# Patient Record
Sex: FEMALE | Race: WHITE | NOT HISPANIC OR LATINO
[De-identification: names, ages, dates, MRNs, and addresses within clinical notes are randomized per-mention and may not be internally consistent; named-entity substitution may affect disease eponyms.]

---

## 2018-02-21 PROBLEM — Z00.00 ENCOUNTER FOR PREVENTIVE HEALTH EXAMINATION: Status: ACTIVE | Noted: 2018-02-21

## 2018-03-05 ENCOUNTER — APPOINTMENT (OUTPATIENT)
Dept: OPHTHALMOLOGY | Facility: CLINIC | Age: 68
End: 2018-03-05

## 2023-05-08 ENCOUNTER — APPOINTMENT (OUTPATIENT)
Dept: PAIN MANAGEMENT | Facility: CLINIC | Age: 73
End: 2023-05-08
Payer: MEDICARE

## 2023-05-08 VITALS — HEART RATE: 79 BPM | SYSTOLIC BLOOD PRESSURE: 115 MMHG | OXYGEN SATURATION: 95 % | DIASTOLIC BLOOD PRESSURE: 70 MMHG

## 2023-05-08 DIAGNOSIS — Z86.79 PERSONAL HISTORY OF OTHER DISEASES OF THE CIRCULATORY SYSTEM: ICD-10-CM

## 2023-05-08 PROCEDURE — 99203 OFFICE O/P NEW LOW 30 MIN: CPT

## 2023-05-08 NOTE — DATA REVIEWED
[FreeTextEntry1] : Lumbar x-ray\par There is anterolisthesis L4-5, L5-S1 there is motion in flexion and in extension there is degenerative malalignment alignment of the upper lumbar region no lytic or blastic bony lesions\par There is scoliotic curvature identified 26.2 degrees in the upper curve of 14.8 degrees of the lower curve is measured with a Cerna technique no bony anomalies the upper curve is convex right centered in the lower thoracic spine lower curve is convex to the left centered in the mid lumbar spine there is grade 1-2 anterolisthesis L4-5\par CT lumbar spine October 2022 compared to MRI from June 2022\par There is near complete loss of disc space at L3-4 slight retrolisthesis there is moderate foraminal stenosis on the right there is bilateral ligamentum flavum hypertrophy these findings produce moderate to severe spinal canal stenosis\par L4-5 there is moderate disc bulging severe foraminal stenosis bilaterally there is bilateral lateral recess stenosis severe facet arthropathy and severe spinal stenosis\par At L5-S1 there is grade 1 anterolisthesis there is moderate disc bulging severe foraminal stenosis bilaterally\par CT October 2022 cervical spine no evidence of metastatic disease there is moderate degenerative disc disease from C2-C7 T1 mild to moderate foraminal stenosis at C4-5, C5-6 C6-7 and C7-T1\par October 2022\par No evidence of osteoporotic fractures DEXA scan\par

## 2023-05-08 NOTE — PHYSICAL EXAM
[Normal] : Well developed, in no acute distress, alert and oriented to person, place and time [Facet Tenderness] : facet tenderness [Paraspinal Tenderness] : paraspinal tenderness [Walker] : ambulates with walker [Neer's] : positive Neer's Test [Patton's Test] : positive Patton's Test [UE/LE] : Motor: 5/5 motor strength in bilateral upper & lower extremities [Sacroiliac tenderness] : no sacroiliac tenderness [Spurling] : negative Spurling's Test [Flores's Sign] : negative Flores's Sign [SLR] : negative straight leg raise [de-identified] : [facility]  [de-identified] : Decreased flexion/extension and lateral bending [de-identified] : flexed walking gait

## 2023-07-14 ENCOUNTER — APPOINTMENT (OUTPATIENT)
Dept: PAIN MANAGEMENT | Facility: CLINIC | Age: 73
End: 2023-07-14
Payer: MEDICARE

## 2023-07-14 DIAGNOSIS — M25.521 PAIN IN RIGHT ELBOW: ICD-10-CM

## 2023-07-14 PROCEDURE — 99202 OFFICE O/P NEW SF 15 MIN: CPT | Mod: 95

## 2023-07-14 NOTE — REASON FOR VISIT
[Home] : at home, [unfilled] , at the time of the visit. [Medical Office: ([facility])___] : at the medical office located in  [Patient] : the patient

## 2023-07-20 ENCOUNTER — APPOINTMENT (OUTPATIENT)
Dept: PAIN MANAGEMENT | Facility: CLINIC | Age: 73
End: 2023-07-20
Payer: MEDICARE

## 2023-07-20 PROCEDURE — 62323 NJX INTERLAMINAR LMBR/SAC: CPT

## 2023-08-04 ENCOUNTER — APPOINTMENT (OUTPATIENT)
Dept: PAIN MANAGEMENT | Facility: CLINIC | Age: 73
End: 2023-08-04
Payer: MEDICARE

## 2023-08-04 DIAGNOSIS — M25.512 PAIN IN LEFT SHOULDER: ICD-10-CM

## 2023-08-04 PROCEDURE — 99214 OFFICE O/P EST MOD 30 MIN: CPT

## 2023-08-04 NOTE — ASSESSMENT
[FreeTextEntry1] : Ms. MEAD is a 72 year F with pain in the left sholder, with pain on abduction, + bowens and neer which may shoulder related, however also having neck pain with radiating to left upper extremity, with prior history of neck and UE raddicular pain, will obtain Left shoulder x ray, MRI C spine, consider bilateral L45, 5s1 facet injection.Will do left shoulder subacromial injection under ultrasound guidance today.   Pertinent Imaging reviewed  Interventions:  her pain is refractory to  both medications and conservative therapy including 6 weeks of Home exercise program/physical therapy, To provide longer term relief of her pain will schedule procedure:   consent signed and placed in the chart the left shoulder was prepped with alcohol A linear array transducer was placed on the left sholder to identify the left subacromial space THen a 25 1.5 inch needle was advanced to the subacromial space after negative aspiration for heme a total of 40 mg of kenalog and 4 cc of 1% lidocaine were administered Needle removed  imaging: left x ray shoulder, MRI C spine  medications:  continue with otc management

## 2023-08-04 NOTE — DATA REVIEWED
[FreeTextEntry1] : Lumbar x-ray There is anterolisthesis L4-5, L5-S1 there is motion in flexion and in extension there is degenerative malalignment alignment of the upper lumbar region no lytic or blastic bony lesions There is scoliotic curvature identified 26.2 degrees in the upper curve of 14.8 degrees of the lower curve is measured with a Cerna technique no bony anomalies the upper curve is convex right centered in the lower thoracic spine lower curve is convex to the left centered in the mid lumbar spine there is grade 1-2 anterolisthesis L4-5 CT lumbar spine October 2022 compared to MRI from June 2022 There is near complete loss of disc space at L3-4 slight retrolisthesis there is moderate foraminal stenosis on the right there is bilateral ligamentum flavum hypertrophy these findings produce moderate to severe spinal canal stenosis L4-5 there is moderate disc bulging severe foraminal stenosis bilaterally there is bilateral lateral recess stenosis severe facet arthropathy and severe spinal stenosis At L5-S1 there is grade 1 anterolisthesis there is moderate disc bulging severe foraminal stenosis bilaterally CT October 2022 cervical spine no evidence of metastatic disease there is moderate degenerative disc disease from C2-C7 T1 mild to moderate foraminal stenosis at C4-5, C5-6 C6-7 and C7-T1 October 2022 No evidence of osteoporotic fractures DEXA scan  MRI L spine 6/23 L4-5 severe stenosis spondylosis

## 2023-08-04 NOTE — HISTORY OF PRESENT ILLNESS
[FreeTextEntry1] : Ms. ROBERT MEAD presents for follow up visit today  Reports Her pain as a 7/10 on the vaz/baker scale She describes the pain  aching/throbbing She reports her pain is located low back and left shoulder radiating to left arm  She is s/p procedure completed:  7/20/23 L5-s1 [facility]ES[facility]   Reports 50% relief after procedure , with improvement in walking and less burning pain  Since injection however she has been having left shoulder pain and left arm pain  She would like to see  for her back pain - for consideration of surgery  Medications reviewed with the patient   She is currently doing Home exercise program 3/week for the past 6 weeks since procedure  She denies bowel/bladder incontinence, saddle anesthesia, or red flag symptoms, having normal BMs.  No significant weakness in the extremities.    [facility]

## 2023-08-04 NOTE — PHYSICAL EXAM
[Normal] : Gait: normal [Walker] : ambulates with walker [Chase] : positive Chase Test [Neer's] : positive Neer's Test [UE/LE] : Motor: 5/5 motor strength in bilateral upper & lower extremities [Flores's Sign] : negative Flores's Sign [SLR] : negative straight leg raise [de-identified] : TTP low back and left shoulder

## 2023-08-11 ENCOUNTER — APPOINTMENT (OUTPATIENT)
Dept: PAIN MANAGEMENT | Facility: CLINIC | Age: 73
End: 2023-08-11
Payer: MEDICARE

## 2023-08-11 DIAGNOSIS — M25.511 PAIN IN RIGHT SHOULDER: ICD-10-CM

## 2023-08-11 PROCEDURE — 99214 OFFICE O/P EST MOD 30 MIN: CPT

## 2023-08-11 RX ORDER — BUPRENORPHINE 7.5 UG/H
7.5 PATCH, EXTENDED RELEASE TRANSDERMAL
Qty: 4 | Refills: 0 | Status: ACTIVE | COMMUNITY
Start: 2023-08-11 | End: 1900-01-01

## 2023-08-18 NOTE — DATA REVIEWED
[FreeTextEntry1] : Lumbar x-ray There is anterolisthesis L4-5, L5-S1 there is motion in flexion and in extension there is degenerative malalignment alignment of the upper lumbar region no lytic or blastic bony lesions There is scoliotic curvature identified 26.2 degrees in the upper curve of 14.8 degrees of the lower curve is measured with a Cerna technique no bony anomalies the upper curve is convex right centered in the lower thoracic spine lower curve is convex to the left centered in the mid lumbar spine there is grade 1-2 anterolisthesis L4-5 CT lumbar spine October 2022 compared to MRI from June 2022 There is near complete loss of disc space at L3-4 slight retrolisthesis there is moderate foraminal stenosis on the right there is bilateral ligamentum flavum hypertrophy these findings produce moderate to severe spinal canal stenosis L4-5 there is moderate disc bulging severe foraminal stenosis bilaterally there is bilateral lateral recess stenosis severe facet arthropathy and severe spinal stenosis At L5-S1 there is grade 1 anterolisthesis there is moderate disc bulging severe foraminal stenosis bilaterally CT October 2022 cervical spine no evidence of metastatic disease there is moderate degenerative disc disease from C2-C7 T1 mild to moderate foraminal stenosis at C4-5, C5-6 C6-7 and C7-T1 October 2022 No evidence of osteoporotic fractures DEXA scan  MRI L spine 6/23 L4-5 severe stenosis spondylosis   US liver hepotemegaly but history of cirrhosis   Liver enzymes: normal

## 2023-08-18 NOTE — PHYSICAL EXAM
[Sacroiliac tenderness] : sacroiliac tenderness [Normal] : Gait: normal [Walker] : ambulates with walker [Neer's] : positive Neer's Test [Chase] : positive Chase Test [UE/LE] : Motor: 5/5 motor strength in bilateral upper & lower extremities [Flores's Sign] : negative Flores's Sign [SLR] : negative straight leg raise [de-identified] : TTP low back and left shoulder, + gaenslens, + yeoman and aman on the left and right , more prevalent on the left

## 2023-08-18 NOTE — HISTORY OF PRESENT ILLNESS
[FreeTextEntry1] : Ms. ROBERT MEAD presents for follow up visit today  Reports Her pain as a 7/10 on the vaz/baker scale She describes the pain aching/throbbing  She reports her pain is located left buttocks and does not radiaite, she also reports similar pain inthe right buttocks but not as severe pain worse with getting up form seated positoin pain worse with going up stairs She did see  who recommend against surgery given her medicatl issues  She is s/p procedure completed:  7/20/23 L5-s1 [place]  Reports 80% relief after procedure  in the back and radicular pain  Medications reviewed with the patient   She is currently doing Home exercise program 3/week for the past 6 weeks since procedure  She denies bowel/bladder incontinence, saddle anesthesia, or red flag symptoms, having normal BMs.  No significant weakness in the extremities.    [facility] reviewed

## 2023-08-18 NOTE — ASSESSMENT
[FreeTextEntry1] : Ms. MEAD is a 72 year F with pain in the low back and left buttocks pain with 3 provocative tests on exam c/w with Si joint pain on the left side, and right side, she will be undergoing infusions for wegners she will touch base with her rheumatologist and hepatologsit to see if she can start buprenorphine patch and schedule bilateral SI joint injfection  Pertinent Imaging reviewed  Interventions:  her pain is refractory to  both medications and conservative therapy including 6 weeks of Home exercise program/physical therapy, To provide longer term relief of her pain will schedule procedure:  bilateral si joint injection imaging: MRI L spine reviewed  medications: start butrans patch 7.5

## 2023-08-31 ENCOUNTER — APPOINTMENT (OUTPATIENT)
Dept: PAIN MANAGEMENT | Facility: CLINIC | Age: 73
End: 2023-08-31
Payer: MEDICARE

## 2023-08-31 PROCEDURE — 27096 INJECT SACROILIAC JOINT: CPT | Mod: 50

## 2023-09-15 ENCOUNTER — APPOINTMENT (OUTPATIENT)
Dept: PAIN MANAGEMENT | Facility: CLINIC | Age: 73
End: 2023-09-15
Payer: MEDICARE

## 2023-09-15 DIAGNOSIS — M54.12 RADICULOPATHY, CERVICAL REGION: ICD-10-CM

## 2023-09-15 DIAGNOSIS — M53.3 SACROCOCCYGEAL DISORDERS, NOT ELSEWHERE CLASSIFIED: ICD-10-CM

## 2023-09-15 DIAGNOSIS — M48.061 SPINAL STENOSIS, LUMBAR REGION WITHOUT NEUROGENIC CLAUDICATION: ICD-10-CM

## 2023-09-15 DIAGNOSIS — M47.816 SPONDYLOSIS W/OUT MYELOPATHY OR RADICULOPATHY, LUMBAR REGION: ICD-10-CM

## 2023-09-15 PROCEDURE — 99202 OFFICE O/P NEW SF 15 MIN: CPT | Mod: 95

## 2023-09-17 PROBLEM — M53.3 SACROILIAC JOINT DYSFUNCTION OF BOTH SIDES: Status: ACTIVE | Noted: 2023-08-18

## 2023-09-17 PROBLEM — M54.12 CERVICAL RADICULAR PAIN: Status: ACTIVE | Noted: 2023-08-04

## 2023-09-17 PROBLEM — M47.816 LUMBAR FACET ARTHROPATHY: Status: ACTIVE | Noted: 2023-05-08

## 2023-09-17 PROBLEM — M48.061 LUMBAR SPINAL STENOSIS: Status: ACTIVE | Noted: 2023-05-08
